# Patient Record
(demographics unavailable — no encounter records)

---

## 2024-12-23 NOTE — PHYSICAL EXAM
[Appropriately responsive] : appropriately responsive [Alert] : alert [No Acute Distress] : no acute distress [No Lymphadenopathy] : no lymphadenopathy [Regular Rate Rhythm] : regular rate rhythm [No Murmurs] : no murmurs [Clear to Auscultation B/L] : clear to auscultation bilaterally [Soft] : soft [Non-tender] : non-tender [Non-distended] : non-distended [No HSM] : No HSM [No Lesions] : no lesions [No Mass] : no mass [Oriented x3] : oriented x3 [Examination Of The Breasts] : a normal appearance [No Masses] : no breast masses were palpable [Labia Majora] : normal [Labia Minora] : normal [Normal] : normal [Uterine Adnexae] : normal [FreeTextEntry9] : guaiac negative, no masses noted

## 2024-12-23 NOTE — PLAN
[FreeTextEntry1] : Routine Gyn Exam: BSA, calcium, vitamin D and exercise d/w pt Pap/HPV conducted w/ GC/Chl STI Screening today with GC/CT cultures, HIV, RPR, Hep B/C  Advised pt to schedule colonoscopy Advised pt to see PCP and dermatologist annually  Abnormal breast imaging 10/2024: Pt to send mammo/sono reports to our office - shown on phone Rx given for mammogram disgnostic right breast for April Referral to breast surgeon given  Prolonged 10d menses, fibroid: pelvic sono, and SHG If wnl, needs endo bx.  D/W her that this is to rule out hyperplasia or cancer  Menorrhagia: D/w pt Lysteda, d/w pt r/b/a - Rx given  Risks of blood clots , VTE, MI, Stroke discussed.  D/w pt IUD, COCP - pt reports h/o mood changes w/ COCP, to consider IUD if not manageable w/ Lysteda  Dysmenorrhea: Rx for Naproxen given, d/w pt r/b/a  Eczema, FHx mother w/ melanoma: Recommended Eucerin moisturizer or OTC 1% hydrocortisone Referral to dermatologist   RTO for endo bx, SHG, TVUS, or PRN   Braxton DUDLEY am scribing for and in the presence of Dr. Gomes in the following sections: HISTORY OF PRESENT ILLNESS, PAST MEDICAL/FAMILY/SOCIAL HISTORY, REVIEW OF SYSTEMS, PHYSICAL EXAM, ASSESSMENT/PLAN.

## 2024-12-23 NOTE — HISTORY OF PRESENT ILLNESS
[Patient reported mammogram was abnormal] : Patient reported mammogram was abnormal [Patient reported breast sonogram was abnormal] : Patient reported breast sonogram was abnormal [Patient reported colonoscopy was normal] : Patient reported colonoscopy was normal [Currently Active] : currently active [Men] : men [Patient would like to be screened for STIs] : Patient would like to be screened for STIs [FreeTextEntry1] : 2024. BURT DEL ROSARIO 47 year old female  LMP 2024 She presents for an annual gyn exam.   Pt reports heavy monthly 3-10d menses, w/ pain that is managed w/ Motrin, and intermittent intermenstrual bleeding. She desires to defer imaging and endo bx till  due to insurance coverage. She reports h/o birth control pill use many years ago, d/c'ed due to associated mood changes. She also reports elevated BPs recently, reportedly normotensive w/ PCP in 2024.   She denies abdominal and pelvic pain, no vaginal discharge or vaginitis symptoms. She reports normal urination, no dysuria, no incontinence of urine. BM is normal per patient, no blood in stool or constipation/diarrhea.  Sees PCP. Sees derm. Pt reports eczema, typically on hands and abdomen, denies being recommended medication management by derm.  Does moisturize Pt walks 6mi qd w/ dog for exercise.   PMH: asthma, eczema Meds: Albuterol PRN OBHx: C/S x1, MAB () GynHx: H/o fibroids, ovarian cyst. No Hx of STI,abnl paps, or pelvic infections. SHx: C/S x1, ovarian cystectomy, cardiac ablation, gastric sleeve in 2021 FHx: Paternal aunt w/ breast ca. MGF w/ colon ca. Father w/ prostate ca during his 50s and blood and kidney ca. Mother w/ vulvar skin ca age 74, melanoma (passed away in )- Mother had neg cancer genetics . Denies FHx of ovarian, uterine, pancreatic cancer. All: PCN Social: Social alcohol. Former smoker, quit . No drug use. 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 [BreastSonogramDate] : 10/2024 [TextBox_25] : Reportedly advised to f/u q 6 months, Rx'ed by PCP. [PapSmeardate] : 10/2023 [TextBox_31] : NILM, HPV neg. [ColonoscopyDate] : 2/2024

## 2025-02-14 NOTE — HISTORY OF PRESENT ILLNESS
[FreeTextEntry1] : BURT DEL ROSARIO 47 year old female  LMP 2024 She presents forfollow up.  Hade light spotting x 1 month -Feb. Pt reports heavy monthly 3-10d menses, w/ pain that is managed w/ Motrin, and intermittent intermenstrual bleeding. She desires to defer imaging and endo bx till  due to insurance coverage. She reports h/o birth control pill use many years ago, d/c'ed due to associated mood changes. She also reports elevated BPs recently, reportedly normotensive w/ PCP in 2024.   SHe is not interested in an IUD She has not yet tried the Lysteda or Naproxen as this last bleed was light.    PMH: asthma, eczema Meds: Albuterol PRN OBHx: C/S x1, MAB () GynHx: H/o fibroids, ovarian cyst. No Hx of STI,abnl paps, or pelvic infections. SHx: C/S x1, ovarian cystectomy, cardiac ablation, gastric sleeve in 2021 FHx: Paternal aunt w/ breast ca. MGF w/ colon ca. Father w/ prostate ca during his 50s and blood and kidney ca. Mother w/ vulvar skin ca age 74, melanoma (passed away in )- Mother had neg cancer genetics . Denies FHx of ovarian, uterine, pancreatic cancer. All: PCN Social: Social alcohol. Former smoker, quit . No drug use. Psych: PHQ9 = 4

## 2025-03-19 NOTE — HISTORY OF PRESENT ILLNESS
[FreeTextEntry1] : 03/19/2025. BURT DEL ROSARIO 47 year old female. She presents for AUB f/u, for endo bx Had bleeding all of January into February.  LMO in March was wnl On 2/14/2025, pt reported intermittent intermenstrual bleeding. She is here today for endo bx, s/p pelvic sono on 2/2024 showing mild adenomyosis and otherwise nml findings, and s/p SHG on 2/24/2025 showing no polyps.  She also reports heavy menses, has not tried Lysteda yet.   PMH: asthma, eczema Meds: Albuterol PRN OBHx: C/S x1, MAB (2000) GynHx: H/o fibroids, ovarian cyst. No Hx of STI,abnl paps, or pelvic infections. SHx: C/S x1, ovarian cystectomy, cardiac ablation, gastric sleeve in 08/2021 FHx: Paternal aunt w/ breast ca. MGF w/ colon ca. Father w/ prostate ca during his 50s and blood and kidney ca. Mother w/ vulvar skin ca age 74, melanoma (passed away in 2024)- Mother had neg cancer genetics 2022. Denies FHx of ovarian, uterine, pancreatic cancer. All: PCN Social: Social alcohol. Former smoker, quit 2003. No drug use.

## 2025-03-19 NOTE — PHYSICAL EXAM
[Chaperone Present] : A chaperone was present in the examining room during all aspects of the physical examination [Appropriately responsive] : appropriately responsive [Alert] : alert [No Acute Distress] : no acute distress [Soft] : soft [Non-tender] : non-tender [Non-distended] : non-distended [No HSM] : No HSM [No Lesions] : no lesions [No Mass] : no mass [Oriented x3] : oriented x3 [Labia Majora] : normal [Labia Minora] : normal [Normal] : normal [Uterine Adnexae] : normal [FreeTextEntry2] : Braxton Foley

## 2025-03-19 NOTE — PLAN
[FreeTextEntry1] : Endometrial Biopsy: Pt tolerated well  Pathology sent - I will call pt with biopsy results in 5-7 days She is advised to call me if she experiences heavy vaginal bleeding, fever, chills or severe pain  AUB: S/p Pelvic sonogram 1/23/2025 - mild adenomyosis, stable left lateral fibroid, thin EML, normal ovaries. S/p SHG 2/24/2025 - no polyps Endo bx done today - pending results  Menorrhagia: Pt to try Lysteda  Contraception counseling: D/w pt COCP and mirena IUD - pt to consider, call to schedule appt if she desires IUD  RTO PRN  I, Braxton Foley, am scribing for and in the presence of Dr. Gomes in the following sections: HISTORY OF PRESENT ILLNESS, PAST MEDICAL/FAMILY/SOCIAL HISTORY, REVIEW OF SYSTEMS, PHYSICAL EXAM, ASSESSMENT/PLAN.

## 2025-03-19 NOTE — PROCEDURE
[Endometrial Biopsy] : Endometrial biopsy [Time out performed] : Pre-procedure time out performed.  Patient's name, date of birth and procedure confirmed. [Consent Obtained] : Consent obtained [Irregular Bleeding] : irregular uterine bleeding [Risks] : risks [Benefits] : benefits [Alternatives] : alternatives [Patient] : patient [Infection] : infection [Allergic Reaction] : allergic reaction [Bleeding] : bleeding [Uterine Perforation] : uterine perforation [Pain] : pain [Negative] : negative pregnancy test [Betadine] : Betadine [None] : none [Easy Passage] : Easy passage [Sounded to ___ cm] : sounded to [unfilled] ~Ucm [Anteverted] : anteverted [Specimen Collected] : collected [Sent to Pathology] : placed in buffered formalin and sent for pathology [Tolerated Well] : Patient tolerated the procedure well [No Complications] : No complications

## 2025-04-24 NOTE — HISTORY OF PRESENT ILLNESS
[FreeTextEntry1] : 48  year old female  LMP 10/9/23.  3 weeks ago She reports heavy, regular menses that are long.  Endo Bx in 3/25 wnl. SHG  wnl. She has known fibroids.  Sono in  reveals 6.1 cm left lateral fibroid.   She is now anemic due to heavy bleeds.   Denies changes in medical status, medications, serious illness, hospitalizations, and surgeries.  PMH: asthma Meds: Albuterol PRN OBHx: C/S x1 GynHx: Hx of fibroids. Hx ovarian cyst. No Hx of STI,abnl paps, or pelvic infections. SHx: C/S x1, ovarian cystectomy, cardiac ablation, gastric sleeve in 2021 FHx: Paternal aunt breast ca. MGF colon ca. Father prostate ca during his 50s and blood and kidney ca. Mother vulvar skin ca at 74. Denies FHx of ovarian, uterine, pancreatic, prostate cancer. All: PCN Social: Social alcohol, drug, or tobacco use, non-smoker.  Vaccine: covid vaccines

## 2025-04-24 NOTE — REASON FOR VISIT
[Telephone (audio)] : This telephonic visit was provided via audio only technology. [Technical] : patient unable to effectively utilize tele-video due to technical issues. [Follow-Up] : a follow-up evaluation of

## 2025-04-24 NOTE — PLAN
[FreeTextEntry1] : Discussed With her the options of COCP, Mirena, Lysteda, Myfebree and tisks and benefits associated with these Surgery discussed as well.   Will start Junel 24 fe as she did well on this in the past D/w pt how to take OCP, and also R/B including but not limited to irregular bleeding, changes in mood, weight changes, VTE, MI, Stroke. Advised pt to avoid smoking. Pt is a non smoker.  D/w pt FHx of no blood clot disorders  RTO in 3 mos for follow up

## 2025-07-02 NOTE — PHYSICAL EXAM
[FreeTextEntry2] : Taco Sams  [Appropriately responsive] : appropriately responsive [Oriented x3] : oriented x3

## 2025-07-02 NOTE — PLAN
[FreeTextEntry1] : Elevated BPs and heavy menses: To discontinue COCPs due to BP, and plan for Mirena IUD insertion Referred to PCP for workup D/W Pt R/B/A of IUD including but not limited to expulsion, migration, embedment, increased risk of infection, PID, less than 1% failure rate, increase risk of ectopic if failure occurs.  Should expect spotting over the next 3-6 mos  after placement.  Discussed EM ablation of Mirena does not work PCP  referral Asthma: Referred to pulm  RTO for Mirena IUD insertion